# Patient Record
Sex: FEMALE | Race: WHITE | NOT HISPANIC OR LATINO | URBAN - METROPOLITAN AREA
[De-identification: names, ages, dates, MRNs, and addresses within clinical notes are randomized per-mention and may not be internally consistent; named-entity substitution may affect disease eponyms.]

---

## 2024-10-17 ENCOUNTER — APPOINTMENT (OUTPATIENT)
Dept: PRIMARY CARE | Facility: CLINIC | Age: 59
End: 2024-10-17

## 2024-10-17 VITALS
SYSTOLIC BLOOD PRESSURE: 136 MMHG | BODY MASS INDEX: 30.14 KG/M2 | HEART RATE: 73 BPM | WEIGHT: 163.8 LBS | OXYGEN SATURATION: 96 % | HEIGHT: 62 IN | DIASTOLIC BLOOD PRESSURE: 90 MMHG

## 2024-10-17 DIAGNOSIS — L40.50 PSORIATIC ARTHRITIS (MULTI): Primary | ICD-10-CM

## 2024-10-17 DIAGNOSIS — K57.90 DIVERTICULOSIS: ICD-10-CM

## 2024-10-17 DIAGNOSIS — Z12.31 ENCOUNTER FOR SCREENING MAMMOGRAM FOR BREAST CANCER: ICD-10-CM

## 2024-10-17 DIAGNOSIS — Z00.00 ANNUAL PHYSICAL EXAM: ICD-10-CM

## 2024-10-17 DIAGNOSIS — R03.0 ELEVATED BLOOD PRESSURE READING IN OFFICE WITHOUT DIAGNOSIS OF HYPERTENSION: ICD-10-CM

## 2024-10-17 DIAGNOSIS — Z23 FLU VACCINE NEED: ICD-10-CM

## 2024-10-17 PROBLEM — D25.1 INTRAMURAL LEIOMYOMA OF UTERUS: Status: ACTIVE | Noted: 2021-07-26

## 2024-10-17 PROCEDURE — 99203 OFFICE O/P NEW LOW 30 MIN: CPT | Performed by: INTERNAL MEDICINE

## 2024-10-17 PROCEDURE — 99386 PREV VISIT NEW AGE 40-64: CPT | Performed by: INTERNAL MEDICINE

## 2024-10-17 PROCEDURE — 90656 IIV3 VACC NO PRSV 0.5 ML IM: CPT | Performed by: INTERNAL MEDICINE

## 2024-10-17 PROCEDURE — 90471 IMMUNIZATION ADMIN: CPT | Performed by: INTERNAL MEDICINE

## 2024-10-17 PROCEDURE — 3008F BODY MASS INDEX DOCD: CPT | Performed by: INTERNAL MEDICINE

## 2024-10-17 PROCEDURE — 1036F TOBACCO NON-USER: CPT | Performed by: INTERNAL MEDICINE

## 2024-10-17 RX ORDER — ACETAMINOPHEN 500 MG
5000 TABLET ORAL
COMMUNITY

## 2024-10-17 RX ORDER — VALACYCLOVIR HYDROCHLORIDE 500 MG/1
500 TABLET, FILM COATED ORAL
COMMUNITY
Start: 2024-09-09

## 2024-10-17 RX ORDER — FOLIC ACID 1 MG/1
1 TABLET ORAL DAILY
COMMUNITY

## 2024-10-17 RX ORDER — METHOTREXATE 2.5 MG/1
10 TABLET ORAL WEEKLY
COMMUNITY
Start: 2024-06-09 | End: 2025-06-09

## 2024-10-17 ASSESSMENT — ENCOUNTER SYMPTOMS
VOMITING: 0
ABDOMINAL PAIN: 0
DIARRHEA: 0
PALPITATIONS: 0
FREQUENCY: 0
TROUBLE SWALLOWING: 0
DYSURIA: 0
CONSTIPATION: 0
NAUSEA: 0
DIZZINESS: 0
LIGHT-HEADEDNESS: 0
SHORTNESS OF BREATH: 0
ARTHRALGIAS: 0
SORE THROAT: 0
FATIGUE: 0
COUGH: 0
FEVER: 0

## 2024-10-17 ASSESSMENT — PATIENT HEALTH QUESTIONNAIRE - PHQ9
2. FEELING DOWN, DEPRESSED OR HOPELESS: NOT AT ALL
SUM OF ALL RESPONSES TO PHQ9 QUESTIONS 1 AND 2: 0
1. LITTLE INTEREST OR PLEASURE IN DOING THINGS: NOT AT ALL

## 2024-10-17 ASSESSMENT — PAIN SCALES - GENERAL: PAINLEVEL_OUTOF10: 0-NO PAIN

## 2024-10-17 NOTE — ASSESSMENT & PLAN NOTE
Patient record blood pressure readings at home over the next month and return in 1 month with those readings so we can evaluate her for possible hypertension.

## 2024-10-17 NOTE — PROGRESS NOTES
Subjective   Patient ID: Crystal Hargrove is a 58 y.o. female who presents for No chief complaint on file..  Patient is here to establish with new primary care insurance at [facility] due to insurance purposes.  Patient has a known diagnosis of psoriatic arthritis and is seeing Dr. Barahona rheumatology at the [facility] however she would like to switch to  physician.  Patient does note that she had 3 UTIs in the past year prior to that it has not been a problem.        Review of Systems   Constitutional:  Negative for fatigue and fever.   HENT:  Negative for sore throat and trouble swallowing.    Eyes:  Negative for visual disturbance.   Respiratory:  Negative for cough and shortness of breath.    Cardiovascular:  Negative for chest pain, palpitations and leg swelling.   Gastrointestinal:  Negative for abdominal pain, constipation, diarrhea, nausea and vomiting.   Genitourinary:  Negative for dysuria and frequency.   Musculoskeletal:  Negative for arthralgias.   Skin:  Negative for rash.   Neurological:  Negative for dizziness and light-headedness.       Objective   Medication Documentation Review Audit       Reviewed by Coco Roberson MD (Physician) on 10/17/24 at 1008      Medication Order Taking? Sig Documenting Provider Last Dose Status   ASCORBIC ACID, VITAMIN C, ORAL [id number]  Take by mouth. Historical Provider, MD  Active   cholecalciferol (Vitamin D-3) 5,000 Units tablet [id number]  Take 1 tablet (5,000 Units) by mouth once daily. Historical Provider, MD  Active   folic acid (Folvite) 1 mg tablet [id number]  Take 1 tablet (1 mg) by mouth once daily. Historical Provider, MD  Active   methotrexate (Trexall) 2.5 mg tablet [id number] Yes Take 4 tablets (10 mg total) by mouth 1 (one) time per week. Historical Provider, MD  Active   valACYclovir (Valtrex) 500 mg tablet [id number] Yes Take 1 tablet (500 mg) by mouth once daily. Historical Provider, MD  Active                  Allergies   Allergen  "Reactions    Amoxicillin Hives    Clarithromycin Hives    Ibuprofen Hives and Swelling    Naproxen Rash    Sulfa (Sulfonamide Antibiotics) Rash       /90   Pulse 73   Ht 1.575 m (5' 2\")   Wt 74.3 kg (163 lb 12.8 oz)   SpO2 96%   BMI 29.96 kg/m²     Physical Exam  Constitutional:       Appearance: Normal appearance.   HENT:      Head: Normocephalic and atraumatic.      Nose: Nose normal.   Eyes:      Extraocular Movements: Extraocular movements intact.      Pupils: Pupils are equal, round, and reactive to light.   Cardiovascular:      Rate and Rhythm: Normal rate and regular rhythm.   Pulmonary:      Breath sounds: Normal breath sounds.   Abdominal:      General: Abdomen is flat. Bowel sounds are normal.      Palpations: Abdomen is soft.   Musculoskeletal:      Right lower leg: No edema.      Left lower leg: No edema.   Neurological:      Mental Status: She is alert.           Assessment/Plan   Problem List Items Addressed This Visit       Psoriatic arthritis (Multi) - Primary     Patient will remain on methotrexate and folic acid until we can get her established with a new rheumatologist at [facility].         Relevant Orders    Referral to Rheumatology    Annual physical exam     Patient's blood pressure is elevated today, she is not sure if it has been consistently elevated.  At this time I have asked her to buy blood pressure machine and check her readings 2-3 times a week both at home and at work and return in 1 month for evaluation of possible hypertension    Baseline labs will be obtained today including CBC, CMP, lipids, thyroid and vitamin D level    Patient needs a mammogram and was given a requisition    Colonoscopy was completed September 2021 and is due in 10 years             Relevant Orders    Lipid Panel    CBC    Comprehensive Metabolic Panel    TSH with reflex to Free T4 if abnormal    Vitamin D 25-Hydroxy,Total (for eval of Vitamin D levels)    Diverticulosis     Patient was " having problems with left lower quadrant abdominal pain in 2021 had a full evaluation and was diagnosed with a spastic colon and diverticulosis after colonoscopy September 2021.  She still has issues with it pain in the area that comes and goes and it does appear to be slightly related to constipation issues.  When she starts feeling the pain she starts taking Metamucil or MiraLAX and typically it then subsides         Elevated blood pressure reading in office without diagnosis of hypertension     Patient record blood pressure readings at home over the next month and return in 1 month with those readings so we can evaluate her for possible hypertension.          Other Visit Diagnoses       Flu vaccine need        Relevant Orders    Flu vaccine, trivalent, preservative free, age 6 months and greater (Fluarix/Fluzone/Flulaval) (Completed)    Encounter for screening mammogram for breast cancer        Relevant Orders    BI mammo bilateral screening tomosynthesis                   It has been a pleasure seeing you.  Coco Roberson MD

## 2024-10-17 NOTE — ASSESSMENT & PLAN NOTE
Patient will remain on methotrexate and folic acid until we can get her established with a new rheumatologist at [facility].

## 2024-10-17 NOTE — ASSESSMENT & PLAN NOTE
Patient's blood pressure is elevated today, she is not sure if it has been consistently elevated.  At this time I have asked her to buy blood pressure machine and check her readings 2-3 times a week both at home and at work and return in 1 month for evaluation of possible hypertension    Baseline labs will be obtained today including CBC, CMP, lipids, thyroid and vitamin D level    Patient needs a mammogram and was given a requisition    Colonoscopy was completed September 2021 and is due in 10 years

## 2024-10-17 NOTE — PATIENT INSTRUCTIONS
Set up [facility] referral for Psoriatic arthritis    Get fasting labs    Check BP 2 to 3 times a week and follow up Dr Roberson in 1 month for BP eval  30 min appointment    Set up mammogram

## 2024-10-17 NOTE — ASSESSMENT & PLAN NOTE
>>ASSESSMENT AND PLAN FOR ELEVATED BLOOD PRESSURE READING IN OFFICE WITHOUT DIAGNOSIS OF HYPERTENSION WRITTEN ON 10/17/2024 10:43 AM BY PACO SHULTZ MD    Patient record blood pressure readings at home over the next month and return in 1 month with those readings so we can evaluate her for possible hypertension.

## 2024-10-17 NOTE — ASSESSMENT & PLAN NOTE
Patient was having problems with left lower quadrant abdominal pain in 2021 had a full evaluation and was diagnosed with a spastic colon and diverticulosis after colonoscopy September 2021.  She still has issues with it pain in the area that comes and goes and it does appear to be slightly related to constipation issues.  When she starts feeling the pain she starts taking Metamucil or MiraLAX and typically it then subsides

## 2024-10-22 ENCOUNTER — HOSPITAL ENCOUNTER (OUTPATIENT)
Dept: RADIOLOGY | Facility: CLINIC | Age: 59
Discharge: HOME | End: 2024-10-22
Payer: COMMERCIAL

## 2024-10-22 VITALS — BODY MASS INDEX: 30 KG/M2 | WEIGHT: 163 LBS | HEIGHT: 62 IN

## 2024-10-22 DIAGNOSIS — Z12.31 ENCOUNTER FOR SCREENING MAMMOGRAM FOR BREAST CANCER: ICD-10-CM

## 2024-10-22 PROCEDURE — 77067 SCR MAMMO BI INCL CAD: CPT | Performed by: RADIOLOGY

## 2024-10-22 PROCEDURE — 77067 SCR MAMMO BI INCL CAD: CPT

## 2024-10-22 PROCEDURE — 77063 BREAST TOMOSYNTHESIS BI: CPT | Performed by: RADIOLOGY

## 2024-10-24 ENCOUNTER — HOSPITAL ENCOUNTER (OUTPATIENT)
Dept: RADIOLOGY | Facility: EXTERNAL LOCATION | Age: 59
Discharge: HOME | End: 2024-10-24

## 2024-10-24 DIAGNOSIS — Z12.31 ENCOUNTER FOR SCREENING MAMMOGRAM FOR BREAST CANCER: ICD-10-CM

## 2024-11-14 ENCOUNTER — APPOINTMENT (OUTPATIENT)
Dept: PRIMARY CARE | Facility: CLINIC | Age: 59
End: 2024-11-14
Payer: COMMERCIAL

## 2024-11-14 VITALS
SYSTOLIC BLOOD PRESSURE: 150 MMHG | BODY MASS INDEX: 30.47 KG/M2 | OXYGEN SATURATION: 97 % | WEIGHT: 165.6 LBS | HEART RATE: 73 BPM | HEIGHT: 62 IN | DIASTOLIC BLOOD PRESSURE: 88 MMHG

## 2024-11-14 DIAGNOSIS — I10 PRIMARY HYPERTENSION: Primary | ICD-10-CM

## 2024-11-14 PROBLEM — E78.00 PURE HYPERCHOLESTEROLEMIA: Status: ACTIVE | Noted: 2024-11-14

## 2024-11-14 PROBLEM — A60.00 GENITAL HERPES SIMPLEX: Status: ACTIVE | Noted: 2024-11-14

## 2024-11-14 PROCEDURE — 3079F DIAST BP 80-89 MM HG: CPT | Performed by: INTERNAL MEDICINE

## 2024-11-14 PROCEDURE — 3077F SYST BP >= 140 MM HG: CPT | Performed by: INTERNAL MEDICINE

## 2024-11-14 PROCEDURE — 1036F TOBACCO NON-USER: CPT | Performed by: INTERNAL MEDICINE

## 2024-11-14 PROCEDURE — 3008F BODY MASS INDEX DOCD: CPT | Performed by: INTERNAL MEDICINE

## 2024-11-14 PROCEDURE — 99213 OFFICE O/P EST LOW 20 MIN: CPT | Performed by: INTERNAL MEDICINE

## 2024-11-14 RX ORDER — LISINOPRIL 2.5 MG/1
2.5 TABLET ORAL DAILY
Qty: 30 TABLET | Refills: 1 | Status: SHIPPED | OUTPATIENT
Start: 2024-11-14 | End: 2025-12-19

## 2024-11-14 ASSESSMENT — ENCOUNTER SYMPTOMS
SORE THROAT: 0
FEVER: 0
CONSTIPATION: 0
NAUSEA: 0
ARTHRALGIAS: 0
VOMITING: 0
DYSURIA: 0
FATIGUE: 0
LIGHT-HEADEDNESS: 0
TROUBLE SWALLOWING: 0
FREQUENCY: 0
ABDOMINAL PAIN: 0
SHORTNESS OF BREATH: 0
PALPITATIONS: 0
DIARRHEA: 0
COUGH: 0
DIZZINESS: 0

## 2024-11-14 ASSESSMENT — PATIENT HEALTH QUESTIONNAIRE - PHQ9
1. LITTLE INTEREST OR PLEASURE IN DOING THINGS: NOT AT ALL
SUM OF ALL RESPONSES TO PHQ9 QUESTIONS 1 AND 2: 0
2. FEELING DOWN, DEPRESSED OR HOPELESS: NOT AT ALL

## 2024-11-14 ASSESSMENT — PAIN SCALES - GENERAL: PAINLEVEL_OUTOF10: 0-NO PAIN

## 2024-11-14 NOTE — PATIENT INSTRUCTIONS
Take lisinopril 2.5mg a day    Follow up Dr Roberson in 1 month for HTN 30 min appointment

## 2024-11-14 NOTE — ASSESSMENT & PLAN NOTE
Patient's blood pressure readings at home and in the office are consistently elevated.  At this time we are going to start her on an ACE inhibitor, specifically lisinopril 2.5 mg daily.  Potential side effects including but not limited to cough and angioedema were reviewed with the patient today.  The purpose of the medication as well as side effects were reviewed with the patient.  She will start lisinopril 2.5 mg daily and I will see her back in 1 month to reevaluate her.  She is to bring in blood pressure readings from home to that follow-up appointment    Patient got labs done at [facility] I have reviewed them with her on her phone and she has a mild elevation in her LDL cholesterol.  She is encouraged to work on a low-fat diet as well.  I have not received the full faxed report from [facility] yet and will respond to the patient when I do

## 2024-11-14 NOTE — PROGRESS NOTES
Subjective   Patient ID: Crystal Hargrove is a 58 y.o. female who presents for 1 month follow up fpr HTN management.    Patient is here for 1 month follow-up for evaluation of possible hypertension.  I readings are significantly higher than hers but even at home her blood pressure is significantly elevated with a diastolic consistently in the 90s.  For this reason we will diagnose her with primary hypertension and start medication.  She has already been working on diet and exercise and lifestyle modifications which she is to continue.  She is encouraged to work on weight loss regular exercise and a low-sodium diet            Review of Systems   Constitutional:  Negative for fatigue and fever.   HENT:  Negative for sore throat and trouble swallowing.    Eyes:  Negative for visual disturbance.   Respiratory:  Negative for cough and shortness of breath.    Cardiovascular:  Negative for chest pain, palpitations and leg swelling.   Gastrointestinal:  Negative for abdominal pain, constipation, diarrhea, nausea and vomiting.   Genitourinary:  Negative for dysuria and frequency.   Musculoskeletal:  Negative for arthralgias.   Skin:  Negative for rash.   Neurological:  Negative for dizziness and light-headedness.       Objective   Medication Documentation Review Audit       Reviewed by Coco Roberson MD (Physician) on 11/14/24 at 0802      Medication Order Taking? Sig Documenting Provider Last Dose Status   ASCORBIC ACID, VITAMIN C, ORAL [id number]  Take by mouth. Historical Provider, MD  Active   cholecalciferol (Vitamin D-3) 5,000 Units tablet [id number]  Take 1 tablet (5,000 Units) by mouth once daily. Historical Provider, MD  Active   folic acid (Folvite) 1 mg tablet [id number]  Take 1 tablet (1 mg) by mouth once daily. Historical Provider, MD  Active   methotrexate (Trexall) 2.5 mg tablet [id number]  Take 4 tablets (10 mg total) by mouth 1 (one) time per week. Historical Provider, MD  Active   valACYclovir (Valtrex) 500 mg  "tablet [id number]  Take 1 tablet (500 mg) by mouth once daily. Historical Provider, MD  Active                  Allergies   Allergen Reactions    Amoxicillin Hives    Clarithromycin Hives    Ibuprofen Hives and Swelling    Naproxen Rash    Sulfa (Sulfonamide Antibiotics) Rash       /88   Pulse 73   Ht 1.575 m (5' 2\")   Wt 75.1 kg (165 lb 9.6 oz)   SpO2 97%   BMI 30.29 kg/m²     Physical Exam  Constitutional:       Appearance: Normal appearance.   HENT:      Head: Normocephalic and atraumatic.      Nose: Nose normal.   Eyes:      Extraocular Movements: Extraocular movements intact.      Pupils: Pupils are equal, round, and reactive to light.   Cardiovascular:      Rate and Rhythm: Normal rate and regular rhythm.   Pulmonary:      Breath sounds: Normal breath sounds.   Abdominal:      General: Abdomen is flat. Bowel sounds are normal.      Palpations: Abdomen is soft.   Musculoskeletal:      Right lower leg: No edema.      Left lower leg: No edema.   Neurological:      Mental Status: She is alert.           Assessment/Plan   Problem List Items Addressed This Visit       Primary hypertension - Primary     Patient's blood pressure readings at home and in the office are consistently elevated.  At this time we are going to start her on an ACE inhibitor, specifically lisinopril 2.5 mg daily.  Potential side effects including but not limited to cough and angioedema were reviewed with the patient today.  The purpose of the medication as well as side effects were reviewed with the patient.  She will start lisinopril 2.5 mg daily and I will see her back in 1 month to reevaluate her.  She is to bring in blood pressure readings from home to that follow-up appointment    Patient got labs done at [facility] I have reviewed them with her on her phone and she has a mild elevation in her LDL cholesterol.  She is encouraged to work on a low-fat diet as well.  I have not received the full faxed report from [facility] yet and will " respond to the patient when I do         Relevant Medications    lisinopril 2.5 mg tablet              It has been a pleasure seeing you.  Coco Roberson MD

## 2024-12-17 ENCOUNTER — APPOINTMENT (OUTPATIENT)
Dept: PRIMARY CARE | Facility: CLINIC | Age: 59
End: 2024-12-17
Payer: COMMERCIAL

## 2024-12-29 DIAGNOSIS — I10 PRIMARY HYPERTENSION: ICD-10-CM

## 2025-01-03 RX ORDER — LISINOPRIL 2.5 MG/1
2.5 TABLET ORAL DAILY
Qty: 30 TABLET | Refills: 1 | OUTPATIENT
Start: 2025-01-03 | End: 2026-02-07

## 2025-01-22 ENCOUNTER — APPOINTMENT (OUTPATIENT)
Dept: PRIMARY CARE | Facility: CLINIC | Age: 60
End: 2025-01-22
Payer: COMMERCIAL

## 2025-01-22 VITALS
BODY MASS INDEX: 30.69 KG/M2 | RESPIRATION RATE: 16 BRPM | SYSTOLIC BLOOD PRESSURE: 120 MMHG | OXYGEN SATURATION: 98 % | DIASTOLIC BLOOD PRESSURE: 80 MMHG | HEART RATE: 75 BPM | WEIGHT: 166.8 LBS | HEIGHT: 62 IN

## 2025-01-22 DIAGNOSIS — Z78.0 ASYMPTOMATIC MENOPAUSAL STATE: Primary | ICD-10-CM

## 2025-01-22 DIAGNOSIS — I10 PRIMARY HYPERTENSION: ICD-10-CM

## 2025-01-22 PROCEDURE — 3074F SYST BP LT 130 MM HG: CPT | Performed by: INTERNAL MEDICINE

## 2025-01-22 PROCEDURE — 3008F BODY MASS INDEX DOCD: CPT | Performed by: INTERNAL MEDICINE

## 2025-01-22 PROCEDURE — 1036F TOBACCO NON-USER: CPT | Performed by: INTERNAL MEDICINE

## 2025-01-22 PROCEDURE — 99213 OFFICE O/P EST LOW 20 MIN: CPT | Performed by: INTERNAL MEDICINE

## 2025-01-22 PROCEDURE — 3079F DIAST BP 80-89 MM HG: CPT | Performed by: INTERNAL MEDICINE

## 2025-01-22 RX ORDER — LISINOPRIL 5 MG/1
5 TABLET ORAL DAILY
Qty: 90 TABLET | Refills: 1 | Status: SHIPPED | OUTPATIENT
Start: 2025-01-22 | End: 2026-02-26

## 2025-01-22 ASSESSMENT — ENCOUNTER SYMPTOMS
PALPITATIONS: 0
FEVER: 0
ARTHRALGIAS: 0
ABDOMINAL PAIN: 0
VOMITING: 0
TROUBLE SWALLOWING: 0
FREQUENCY: 0
FATIGUE: 0
SORE THROAT: 0
DIARRHEA: 0
SHORTNESS OF BREATH: 0
DYSURIA: 0
COUGH: 0
LIGHT-HEADEDNESS: 0
CONSTIPATION: 0
NAUSEA: 0
DIZZINESS: 0

## 2025-01-22 ASSESSMENT — PATIENT HEALTH QUESTIONNAIRE - PHQ9
SUM OF ALL RESPONSES TO PHQ9 QUESTIONS 1 AND 2: 0
2. FEELING DOWN, DEPRESSED OR HOPELESS: NOT AT ALL
1. LITTLE INTEREST OR PLEASURE IN DOING THINGS: NOT AT ALL

## 2025-01-22 ASSESSMENT — PAIN SCALES - GENERAL: PAINLEVEL_OUTOF10: 0-NO PAIN

## 2025-01-22 NOTE — ASSESSMENT & PLAN NOTE
Patient's blood pressure is still not optimal at this time so we will increase lisinopril to 5 mg daily.  She will see me back in 4 months at which time she will bring in a series of home blood pressure readings again.

## 2025-01-22 NOTE — PATIENT INSTRUCTIONS
Increase lisinopril to 5 mg a day    Follow up Dr Roberson in 4 months for HTN, and bring in home [facility] to appointment.    Get dxa after 3/16/25

## 2025-01-22 NOTE — PROGRESS NOTES
Subjective   Patient ID: Crystal Hargrove is a 59 y.o. female who presents for No chief complaint on file..  Patient is here for routine follow-up on her hypertension.  She brings in several blood pressure readings from home and they are consistently mildly elevated.  She is taking lisinopril 2.5 mg daily and has no apparent side effects including no cough.        Review of Systems   Constitutional:  Negative for fatigue and fever.   HENT:  Negative for sore throat and trouble swallowing.    Eyes:  Negative for visual disturbance.   Respiratory:  Negative for cough and shortness of breath.    Cardiovascular:  Negative for chest pain, palpitations and leg swelling.   Gastrointestinal:  Negative for abdominal pain, constipation, diarrhea, nausea and vomiting.   Genitourinary:  Negative for dysuria and frequency.   Musculoskeletal:  Negative for arthralgias.   Skin:  Negative for rash.   Neurological:  Negative for dizziness and light-headedness.       Objective   Medication Documentation Review Audit       Reviewed by Coco Roberson MD (Physician) on 01/22/25 at 0840      Medication Order Taking? Sig Documenting Provider Last Dose Status   ASCORBIC ACID, VITAMIN C, ORAL [id number]  Take by mouth. Historical Provider, MD  Active   cholecalciferol (Vitamin D-3) 5,000 Units tablet [id number]  Take 1 tablet (5,000 Units) by mouth once daily. Historical Provider, MD  Active   folic acid (Folvite) 1 mg tablet [id number]  Take 1 tablet (1 mg) by mouth once daily. Historical Provider, MD  Active   lisinopril 2.5 mg tablet [id number]  Take 1 tablet (2.5 mg) by mouth once daily. Coco Roberson MD  Active   methotrexate (Trexall) 2.5 mg tablet [id number]  Take 4 tablets (10 mg total) by mouth 1 (one) time per week. Historical Provider, MD  Active   valACYclovir (Valtrex) 500 mg tablet [id number]  Take 1 tablet (500 mg) by mouth once daily. Historical Provider, MD  Active                  Allergies   Allergen Reactions    Amoxicillin  "Hives    Clarithromycin Hives    Ibuprofen Hives and Swelling    Naproxen Rash    Sulfa (Sulfonamide Antibiotics) Rash       /80   Pulse 75   Resp 16   Ht 1.575 m (5' 2\")   Wt 75.7 kg (166 lb 12.8 oz)   SpO2 98%   BMI 30.51 kg/m²     Physical Exam  Constitutional:       Appearance: Normal appearance.   HENT:      Head: Normocephalic and atraumatic.      Nose: Nose normal.   Eyes:      Extraocular Movements: Extraocular movements intact.      Pupils: Pupils are equal, round, and reactive to light.   Cardiovascular:      Rate and Rhythm: Normal rate and regular rhythm.   Pulmonary:      Breath sounds: Normal breath sounds.   Abdominal:      General: Abdomen is flat. Bowel sounds are normal.      Palpations: Abdomen is soft.   Musculoskeletal:      Right lower leg: No edema.      Left lower leg: No edema.   Neurological:      Mental Status: She is alert.           Assessment/Plan   Problem List Items Addressed This Visit       Primary hypertension     Patient's blood pressure is still not optimal at this time so we will increase lisinopril to 5 mg daily.  She will see me back in 4 months at which time she will bring in a series of home blood pressure readings again.           Relevant Medications    lisinopril 5 mg tablet     Other Visit Diagnoses       Asymptomatic menopausal state    -  Primary    Relevant Orders    XR DEXA bone density          Patient is due for bone density was given an order today we will set up that appointment after March 16         It has been a pleasure seeing you.  Coco Roberson MD  "

## 2025-02-11 ENCOUNTER — APPOINTMENT (OUTPATIENT)
Dept: RHEUMATOLOGY | Facility: CLINIC | Age: 60
End: 2025-02-11
Payer: COMMERCIAL

## 2025-02-11 VITALS
BODY MASS INDEX: 30 KG/M2 | HEART RATE: 67 BPM | DIASTOLIC BLOOD PRESSURE: 65 MMHG | SYSTOLIC BLOOD PRESSURE: 120 MMHG | WEIGHT: 164 LBS

## 2025-02-11 DIAGNOSIS — L40.50 PSORIATIC ARTHRITIS (MULTI): ICD-10-CM

## 2025-02-11 PROCEDURE — 3074F SYST BP LT 130 MM HG: CPT | Performed by: INTERNAL MEDICINE

## 2025-02-11 PROCEDURE — 99204 OFFICE O/P NEW MOD 45 MIN: CPT | Performed by: INTERNAL MEDICINE

## 2025-02-11 PROCEDURE — 3078F DIAST BP <80 MM HG: CPT | Performed by: INTERNAL MEDICINE

## 2025-02-11 RX ORDER — FOLIC ACID 1 MG/1
1 TABLET ORAL DAILY
Qty: 90 TABLET | Refills: 1 | Status: SHIPPED | OUTPATIENT
Start: 2025-02-11

## 2025-02-11 RX ORDER — METHOTREXATE 2.5 MG/1
12.5 TABLET ORAL WEEKLY
Qty: 20 TABLET | Refills: 3 | Status: SHIPPED | OUTPATIENT
Start: 2025-02-11

## 2025-02-11 NOTE — PROGRESS NOTES
59 y.o.        female    referred by Dr. Roberson        CHIEF COMPLAINT: Management of PsA    HPI: 59 yr olf WF with H/O psoriatic arthritis since 2012. It was well controled with methotrexate but in the last year has flared. She has morning stiffness for 30 minutes. Has pain in  MCPs. No pain in shoulders, PIPs and wrists. Has no pain in feet. She is able to hike with no problem. Knees hurt when sitting at the desk.   She has chronic neck pain.     Patient Active Problem List   Diagnosis    Intramural leiomyoma of uterus    Psoriatic arthritis (Multi)    Annual physical exam    Diverticulosis    Pure hypercholesterolemia    Genital herpes simplex    Primary hypertension         Past Medical History:   Diagnosis Date    Personal history of diseases of the skin and subcutaneous tissue     History of psoriatic arthritis    Personal history of other diseases of the musculoskeletal system and connective tissue     History of polyarthritis    Personal history of other diseases of the nervous system and sense organs     History of impacted cerumen    Polymyalgia rheumatica (Multi)     Polymyalgia    Unspecified otitis externa, right ear     Otitis externa of right ear            Past Surgical History:   Procedure Laterality Date    MOUTH SURGERY  03/20/2015    Oral Surgery Tooth Extraction    WISDOM TOOTH EXTRACTION         Allergies   Allergen Reactions    Amoxicillin Hives    Clarithromycin Hives    Ibuprofen Hives and Swelling    Naproxen Rash    Sulfa (Sulfonamide Antibiotics) Rash       Medication Documentation Review Audit       Reviewed by Jude Jim MA (Medical Assistant) on 02/11/25 at 1014      Medication Order Taking? Sig Documenting Provider Last Dose Status   ASCORBIC ACID, VITAMIN C, ORAL [id number]  Take by mouth. Historical Provider, MD  Active   cholecalciferol (Vitamin D-3) 5,000 Units tablet [id number]  Take 1 tablet (5,000 Units) by mouth once daily. Historical Provider, MD  Active   folic  acid (Folvite) 1 mg tablet [id number]  Take 1 tablet (1 mg) by mouth once daily. Historical Provider, MD  Active   lisinopril 5 mg tablet [id number]  Take 1 tablet (5 mg) by mouth once daily. Coco Roberson MD  Active   methotrexate (Trexall) 2.5 mg tablet [id number]  Take 4 tablets (10 mg total) by mouth 1 (one) time per week. Historical Provider, MD  Active   valACYclovir (Valtrex) 500 mg tablet [id number]  Take 1 tablet (500 mg) by mouth once daily. Historical Provider, MD  Active                        Family History   Problem Relation Name Age of Onset    Breast cancer Mother  75    Stroke Mother      Transient ischemic attack Father      No Known Problems Sister      No Known Problems Brother      Breast cancer Maternal Grandmother            Social History     Tobacco Use    Smoking status: Former     Current packs/day: 0.25     Average packs/day: 0.3 packs/day for 40.1 years (10.0 ttl pk-yrs)     Types: Cigarettes     Start date: 1985     Passive exposure: Never    Smokeless tobacco: Never   Vaping Use    Vaping status: Never Used   Substance Use Topics    Alcohol use: Yes     Comment: once a week    Drug use: Never         Review of Systems    REVIEW OF SYSTEMS:  Constitutional: No fatigue  Skin:  No rash or psoriasis or photosensitvity  Head: No headache, oral ulcers or hair loss  Neck: No difficulty swallowing or choking  Eyes: No dry eyes or iritis  Mouth: No dry mouth  or oral ulcers  Pulmonary: No wheezing, pleurisy or SOB  Cardiovascular: No chest pain or palpitations  Gastrointestinal: No abdominal pain, nausea, heartburn, or blood in stool  Endocrine: No Raynaud's   Musculoskeletal: As H/P        PHYSICAL EXAM:  Visit Vitals  /65   Pulse 67     General - NAD, sitting up in chair, well-groomed, pleasant, AAOx3  Head: Normocephalic, atraumatic  Eyes - PERRLA, EOMI. No conjunctiva injection.   Mouth/ENT - Moist oral and nasal mucosa. No facial rash. No enlarged parotid or submandibular gland.  Adequate salivary pooling.  Cardiovascular - Normal S1, S2. Regular rate and rhythm. No murmurs or rubs.  Lungs - Symmetric chest expansion. Clear to auscultation bilaterally.   Skin - No rashes or ulcers. Skin warm and dry. No erythema on bilateral cheeks.  Abdomen - Soft, non-tender. No masses. Normal bowel sounds.  Extremities - No edema, cyanosis ,or clubbing  Neurological - Alert and oriented x 3,  grossly intact. No focal deficit.  Musculoskeletal -  EXAMJOINTDETAILED,  Shoulders: Full ROM, without pain, no swelling, warmth or tenderness.  Elbows: Full ROM, without pain, no swelling, warmth or tenderness.  Wrists: Full ROM, without pain, no swelling, warmth or tenderness.  MCP: No swelling, warmth or tenderness. Metacarpal squeeze negative  PIP: No swelling, warmth or tenderness.  DIP: No swelling, warmth or tenderness.  Hands : 5/5.    Ankles: Full ROM, without pain, swelling, warmth or tenderness.  Toes: No swelling, warmth or tenderness. Metatarsal squeeze negative    Assessment/plan: 59 yr old with PsA. She has mild arthralgia's in hands. No evidenc eof synovitis. Will increase methotrexate to 5 pills/week, Will follow up in 4 months.   Long term use of high risk meds: Labs ordered.     Reviewed and approved by EDGAR HOBSON on 2/11/25 at 10:29 AM.        Edgar Hobson MD

## 2025-03-13 DIAGNOSIS — A60.00 GENITAL HERPES SIMPLEX, UNSPECIFIED SITE: Primary | ICD-10-CM

## 2025-03-13 NOTE — TELEPHONE ENCOUNTER
This can wait until Monday    Refill    valACYclovir (Valtrex) 500 mg tablet     [facility] - [facility]

## 2025-03-16 RX ORDER — VALACYCLOVIR HYDROCHLORIDE 500 MG/1
500 TABLET, FILM COATED ORAL
Qty: 90 TABLET | Refills: 0 | Status: SHIPPED | OUTPATIENT
Start: 2025-03-16

## 2025-03-19 ENCOUNTER — APPOINTMENT (OUTPATIENT)
Dept: RADIOLOGY | Facility: CLINIC | Age: 60
End: 2025-03-19
Payer: COMMERCIAL

## 2025-04-17 ENCOUNTER — APPOINTMENT (OUTPATIENT)
Dept: RADIOLOGY | Facility: CLINIC | Age: 60
End: 2025-04-17
Payer: COMMERCIAL

## 2025-04-30 ENCOUNTER — HOSPITAL ENCOUNTER (OUTPATIENT)
Dept: RADIOLOGY | Facility: CLINIC | Age: 60
Discharge: HOME | End: 2025-04-30
Payer: COMMERCIAL

## 2025-04-30 DIAGNOSIS — Z78.0 ASYMPTOMATIC MENOPAUSAL STATE: ICD-10-CM

## 2025-04-30 PROCEDURE — 77080 DXA BONE DENSITY AXIAL: CPT

## 2025-04-30 PROCEDURE — 77080 DXA BONE DENSITY AXIAL: CPT | Performed by: STUDENT IN AN ORGANIZED HEALTH CARE EDUCATION/TRAINING PROGRAM

## 2025-05-06 ENCOUNTER — TELEPHONE (OUTPATIENT)
Dept: PRIMARY CARE | Facility: CLINIC | Age: 60
End: 2025-05-06
Payer: COMMERCIAL

## 2025-05-06 NOTE — TELEPHONE ENCOUNTER
----- Message from Coco Roberson sent at 5/4/2025  3:21 PM EDT -----  Notify patient she has osteopenia on her dxa or bone density.  This is a precursor to osteoporosis.  She should take a minimum of 2000iu of VitD3 OTC every day and a minimum of 500mg of calcium 3   times a day.  Dr Roberson recommends gummie calcium chews so they are easier to take after each meal.    ----- Message -----  From: Interface, Radiology Results In  Sent: 5/2/2025  12:06 PM EDT  To: Coco Roberson MD

## 2025-05-22 ENCOUNTER — APPOINTMENT (OUTPATIENT)
Dept: PRIMARY CARE | Facility: CLINIC | Age: 60
End: 2025-05-22

## 2025-05-22 VITALS
OXYGEN SATURATION: 95 % | BODY MASS INDEX: 30.69 KG/M2 | HEART RATE: 84 BPM | DIASTOLIC BLOOD PRESSURE: 72 MMHG | WEIGHT: 166.8 LBS | HEIGHT: 62 IN | SYSTOLIC BLOOD PRESSURE: 110 MMHG

## 2025-05-22 DIAGNOSIS — I10 PRIMARY HYPERTENSION: Primary | ICD-10-CM

## 2025-05-22 DIAGNOSIS — M85.89 OSTEOPENIA OF MULTIPLE SITES: ICD-10-CM

## 2025-05-22 DIAGNOSIS — L40.50 PSORIATIC ARTHRITIS (MULTI): ICD-10-CM

## 2025-05-22 PROCEDURE — 3074F SYST BP LT 130 MM HG: CPT | Performed by: INTERNAL MEDICINE

## 2025-05-22 PROCEDURE — 3008F BODY MASS INDEX DOCD: CPT | Performed by: INTERNAL MEDICINE

## 2025-05-22 PROCEDURE — 1036F TOBACCO NON-USER: CPT | Performed by: INTERNAL MEDICINE

## 2025-05-22 PROCEDURE — 99214 OFFICE O/P EST MOD 30 MIN: CPT | Performed by: INTERNAL MEDICINE

## 2025-05-22 PROCEDURE — 3078F DIAST BP <80 MM HG: CPT | Performed by: INTERNAL MEDICINE

## 2025-05-22 ASSESSMENT — ENCOUNTER SYMPTOMS
NAUSEA: 0
DIZZINESS: 0
ABDOMINAL PAIN: 0
DIARRHEA: 0
PALPITATIONS: 0
DYSURIA: 0
FREQUENCY: 0
ARTHRALGIAS: 0
VOMITING: 0
FEVER: 0
COUGH: 0
CONSTIPATION: 0
SHORTNESS OF BREATH: 0
LIGHT-HEADEDNESS: 0
TROUBLE SWALLOWING: 0
SORE THROAT: 0
FATIGUE: 0

## 2025-05-22 ASSESSMENT — PAIN SCALES - GENERAL: PAINLEVEL_OUTOF10: 0-NO PAIN

## 2025-05-22 NOTE — PROGRESS NOTES
Subjective   Patient ID: Crystal Hargrove is a 59 y.o. female who presents for 4 month follow up for HTN and cholesterol management.    Patient is here for an regular 4-month follow-up for her hypertension high cholesterol and osteopenia  Patient recently had a bone density this past month which did show osteopenia.  She is now taking calcium 500 mg 3 times a day and vitamin D.  Medication list was updated appropriately.    Mammogram is not due until October as are her annual blood work or labs.        Review of Systems   Constitutional:  Negative for fatigue and fever.   HENT:  Negative for sore throat and trouble swallowing.    Eyes:  Negative for visual disturbance.   Respiratory:  Negative for cough and shortness of breath.    Cardiovascular:  Negative for chest pain, palpitations and leg swelling.   Gastrointestinal:  Negative for abdominal pain, constipation, diarrhea, nausea and vomiting.   Genitourinary:  Negative for dysuria and frequency.   Musculoskeletal:  Negative for arthralgias.   Skin:  Negative for rash.   Neurological:  Negative for dizziness and light-headedness.       Objective   Medication Documentation Review Audit       Reviewed by Coco Roberson MD (Physician) on 05/22/25 at 0831      Medication Order Taking? Sig Documenting Provider Last Dose Status   ASCORBIC ACID, VITAMIN C, ORAL [id number]  Take by mouth.   Patient not taking: Reported on 5/22/2025    Historical Provider, MD  Active   cholecalciferol (Vitamin D-3) 5,000 Units tablet [id number] Yes Take 1 tablet (5,000 Units) by mouth once daily. Historical Provider, MD  Active   folic acid (Folvite) 1 mg tablet [id number] Yes Take 1 tablet (1 mg) by mouth once daily. Lorraine Rodriguez MD  Active   lisinopril 5 mg tablet [id number] Yes Take 1 tablet (5 mg) by mouth once daily. Coco Roberson MD  Active   methotrexate (Trexall) 2.5 mg tablet [id number] Yes Take 5 tablets (12.5 mg total) by mouth 1 (one) time per week. Lorraine Rodriguez MD   "Active   valACYclovir (Valtrex) 500 mg tablet [id number] Yes Take 1 tablet (500 mg) by mouth once daily. Coco Roberson MD  Active                  Allergies[1]    /72   Pulse 84   Ht 1.575 m (5' 2\")   Wt 75.7 kg (166 lb 12.8 oz)   SpO2 95%   BMI 30.51 kg/m²     Physical Exam  Constitutional:       Appearance: Normal appearance.   HENT:      Head: Normocephalic and atraumatic.      Nose: Nose normal.   Eyes:      Extraocular Movements: Extraocular movements intact.      Pupils: Pupils are equal, round, and reactive to light.   Cardiovascular:      Rate and Rhythm: Normal rate and regular rhythm.   Pulmonary:      Breath sounds: Normal breath sounds.   Abdominal:      General: Abdomen is flat. Bowel sounds are normal.      Palpations: Abdomen is soft.   Musculoskeletal:      Right lower leg: No edema.      Left lower leg: No edema.   Neurological:      Mental Status: She is alert.           Assessment/Plan   Problem List Items Addressed This Visit       Psoriatic arthritis (Multi)    This is managed through her rheumatologist and she remains on methotrexate and folic acid         Primary hypertension - Primary    Blood pressure stable well-controlled.  Patient did not in any refills today         Osteopenia of multiple sites    Patient has osteopenia on bone density so she is now taking calcium, vitamin D and she is encouraged to exercise 4 times a week for 40 minutes.  Next bone density will be obtained in 2 years                   It has been a pleasure seeing you.  Coco Roberson MD         [1]   Allergies  Allergen Reactions    Amoxicillin Hives    Clarithromycin Hives    Ibuprofen Hives and Swelling    Naproxen Rash    Sulfa (Sulfonamide Antibiotics) Rash     "

## 2025-05-22 NOTE — ASSESSMENT & PLAN NOTE
Patient has osteopenia on bone density so she is now taking calcium, vitamin D and she is encouraged to exercise 4 times a week for 40 minutes.  Next bone density will be obtained in 2 years

## 2025-06-13 DIAGNOSIS — L40.50 PSORIATIC ARTHRITIS (MULTI): ICD-10-CM

## 2025-06-13 DIAGNOSIS — A60.00 GENITAL HERPES SIMPLEX, UNSPECIFIED SITE: ICD-10-CM

## 2025-06-13 RX ORDER — VALACYCLOVIR HYDROCHLORIDE 500 MG/1
500 TABLET, FILM COATED ORAL
Qty: 90 TABLET | Refills: 0 | Status: SHIPPED | OUTPATIENT
Start: 2025-06-13

## 2025-06-13 RX ORDER — FOLIC ACID 1 MG/1
1 TABLET ORAL DAILY
Qty: 90 TABLET | Refills: 1 | Status: SHIPPED | OUTPATIENT
Start: 2025-06-13

## 2025-06-13 NOTE — TELEPHONE ENCOUNTER
Refill    valACYclovir (Valtrex) 500 mg tablet   500 mg, Daily RT     folic acid (Folvite) 1 mg tablet   1 mg, Daily     [facility]

## 2025-06-15 LAB
ALBUMIN SERPL-MCNC: 4.3 G/DL (ref 3.6–5.1)
ALP SERPL-CCNC: 52 U/L (ref 37–153)
ALT SERPL-CCNC: 25 U/L (ref 6–29)
ANION GAP SERPL CALCULATED.4IONS-SCNC: 6 MMOL/L (CALC) (ref 7–17)
AST SERPL-CCNC: 19 U/L (ref 10–35)
BASOPHILS # BLD AUTO: 30 CELLS/UL (ref 0–200)
BASOPHILS NFR BLD AUTO: 0.7 %
BILIRUB SERPL-MCNC: 1.5 MG/DL (ref 0.2–1.2)
BUN SERPL-MCNC: 21 MG/DL (ref 7–25)
CALCIUM SERPL-MCNC: 9.1 MG/DL (ref 8.6–10.4)
CHLORIDE SERPL-SCNC: 106 MMOL/L (ref 98–110)
CO2 SERPL-SCNC: 27 MMOL/L (ref 20–32)
CREAT SERPL-MCNC: 0.63 MG/DL (ref 0.5–1.03)
CRP SERPL-MCNC: NORMAL MG/L
EGFRCR SERPLBLD CKD-EPI 2021: 103 ML/MIN/1.73M2
EOSINOPHIL # BLD AUTO: 133 CELLS/UL (ref 15–500)
EOSINOPHIL NFR BLD AUTO: 3.1 %
ERYTHROCYTE [DISTWIDTH] IN BLOOD BY AUTOMATED COUNT: 12.8 % (ref 11–15)
GLUCOSE SERPL-MCNC: 96 MG/DL (ref 65–99)
HCT VFR BLD AUTO: 42.2 % (ref 35–45)
HGB BLD-MCNC: 14 G/DL (ref 11.7–15.5)
LYMPHOCYTES # BLD AUTO: 1651 CELLS/UL (ref 850–3900)
LYMPHOCYTES NFR BLD AUTO: 38.4 %
MCH RBC QN AUTO: 32.1 PG (ref 27–33)
MCHC RBC AUTO-ENTMCNC: 33.2 G/DL (ref 32–36)
MCV RBC AUTO: 96.8 FL (ref 80–100)
MONOCYTES # BLD AUTO: 323 CELLS/UL (ref 200–950)
MONOCYTES NFR BLD AUTO: 7.5 %
NEUTROPHILS # BLD AUTO: 2163 CELLS/UL (ref 1500–7800)
NEUTROPHILS NFR BLD AUTO: 50.3 %
PLATELET # BLD AUTO: 187 THOUSAND/UL (ref 140–400)
PMV BLD REES-ECKER: 9.2 FL (ref 7.5–12.5)
POTASSIUM SERPL-SCNC: 4.1 MMOL/L (ref 3.5–5.3)
PROT SERPL-MCNC: 6.3 G/DL (ref 6.1–8.1)
RBC # BLD AUTO: 4.36 MILLION/UL (ref 3.8–5.1)
SODIUM SERPL-SCNC: 139 MMOL/L (ref 135–146)
WBC # BLD AUTO: 4.3 THOUSAND/UL (ref 3.8–10.8)

## 2025-06-16 LAB
ALBUMIN SERPL-MCNC: 4.3 G/DL (ref 3.6–5.1)
ALP SERPL-CCNC: 52 U/L (ref 37–153)
ALT SERPL-CCNC: 25 U/L (ref 6–29)
ANION GAP SERPL CALCULATED.4IONS-SCNC: 6 MMOL/L (CALC) (ref 7–17)
AST SERPL-CCNC: 19 U/L (ref 10–35)
BASOPHILS # BLD AUTO: 30 CELLS/UL (ref 0–200)
BASOPHILS NFR BLD AUTO: 0.7 %
BILIRUB SERPL-MCNC: 1.5 MG/DL (ref 0.2–1.2)
BUN SERPL-MCNC: 21 MG/DL (ref 7–25)
CALCIUM SERPL-MCNC: 9.1 MG/DL (ref 8.6–10.4)
CHLORIDE SERPL-SCNC: 106 MMOL/L (ref 98–110)
CO2 SERPL-SCNC: 27 MMOL/L (ref 20–32)
CREAT SERPL-MCNC: 0.63 MG/DL (ref 0.5–1.03)
CRP SERPL-MCNC: 4 MG/L
EGFRCR SERPLBLD CKD-EPI 2021: 103 ML/MIN/1.73M2
EOSINOPHIL # BLD AUTO: 133 CELLS/UL (ref 15–500)
EOSINOPHIL NFR BLD AUTO: 3.1 %
ERYTHROCYTE [DISTWIDTH] IN BLOOD BY AUTOMATED COUNT: 12.8 % (ref 11–15)
GLUCOSE SERPL-MCNC: 96 MG/DL (ref 65–99)
HCT VFR BLD AUTO: 42.2 % (ref 35–45)
HGB BLD-MCNC: 14 G/DL (ref 11.7–15.5)
LYMPHOCYTES # BLD AUTO: 1651 CELLS/UL (ref 850–3900)
LYMPHOCYTES NFR BLD AUTO: 38.4 %
MCH RBC QN AUTO: 32.1 PG (ref 27–33)
MCHC RBC AUTO-ENTMCNC: 33.2 G/DL (ref 32–36)
MCV RBC AUTO: 96.8 FL (ref 80–100)
MONOCYTES # BLD AUTO: 323 CELLS/UL (ref 200–950)
MONOCYTES NFR BLD AUTO: 7.5 %
NEUTROPHILS # BLD AUTO: 2163 CELLS/UL (ref 1500–7800)
NEUTROPHILS NFR BLD AUTO: 50.3 %
PLATELET # BLD AUTO: 187 THOUSAND/UL (ref 140–400)
PMV BLD REES-ECKER: 9.2 FL (ref 7.5–12.5)
POTASSIUM SERPL-SCNC: 4.1 MMOL/L (ref 3.5–5.3)
PROT SERPL-MCNC: 6.3 G/DL (ref 6.1–8.1)
RBC # BLD AUTO: 4.36 MILLION/UL (ref 3.8–5.1)
SODIUM SERPL-SCNC: 139 MMOL/L (ref 135–146)
WBC # BLD AUTO: 4.3 THOUSAND/UL (ref 3.8–10.8)

## 2025-06-17 ENCOUNTER — TELEPHONE (OUTPATIENT)
Dept: PRIMARY CARE | Facility: CLINIC | Age: 60
End: 2025-06-17
Payer: COMMERCIAL

## 2025-06-17 NOTE — TELEPHONE ENCOUNTER
----- Message from Coco Roberson sent at 6/17/2025  8:00 AM EDT -----  Please notify patient all her labs were normal including her C-reactive protein.  Her chemistry profile has a mild elevation in the bilirubin which is normal when some people are fasting.    ----- Message -----  From: [facility] Results In  Sent: 6/15/2025   3:13 AM EDT  To: Coco Roberson MD

## 2025-06-26 DIAGNOSIS — L40.50 PSORIATIC ARTHRITIS (MULTI): ICD-10-CM

## 2025-06-26 RX ORDER — METHOTREXATE 2.5 MG/1
12.5 TABLET ORAL WEEKLY
Qty: 20 TABLET | Refills: 3 | Status: SHIPPED | OUTPATIENT
Start: 2025-06-26

## 2025-07-01 ENCOUNTER — APPOINTMENT (OUTPATIENT)
Dept: RHEUMATOLOGY | Facility: CLINIC | Age: 60
End: 2025-07-01
Payer: COMMERCIAL

## 2025-07-30 DIAGNOSIS — I10 PRIMARY HYPERTENSION: ICD-10-CM

## 2025-07-30 RX ORDER — LISINOPRIL 5 MG/1
5 TABLET ORAL DAILY
Qty: 90 TABLET | Refills: 1 | Status: SHIPPED | OUTPATIENT
Start: 2025-07-30 | End: 2026-09-03

## 2025-08-12 ENCOUNTER — APPOINTMENT (OUTPATIENT)
Dept: RHEUMATOLOGY | Facility: CLINIC | Age: 60
End: 2025-08-12

## 2025-08-26 ENCOUNTER — APPOINTMENT (OUTPATIENT)
Dept: RHEUMATOLOGY | Facility: CLINIC | Age: 60
End: 2025-08-26

## 2025-08-26 VITALS
HEART RATE: 81 BPM | SYSTOLIC BLOOD PRESSURE: 105 MMHG | DIASTOLIC BLOOD PRESSURE: 68 MMHG | HEIGHT: 62 IN | BODY MASS INDEX: 29.44 KG/M2 | WEIGHT: 160 LBS

## 2025-08-26 DIAGNOSIS — Z79.899 LONG-TERM USE OF HIGH-RISK MEDICATION: Primary | ICD-10-CM

## 2025-08-26 DIAGNOSIS — L40.50 PSORIATIC ARTHRITIS (MULTI): ICD-10-CM

## 2025-08-26 PROCEDURE — 3074F SYST BP LT 130 MM HG: CPT | Performed by: INTERNAL MEDICINE

## 2025-08-26 PROCEDURE — 99214 OFFICE O/P EST MOD 30 MIN: CPT | Performed by: INTERNAL MEDICINE

## 2025-08-26 PROCEDURE — 3008F BODY MASS INDEX DOCD: CPT | Performed by: INTERNAL MEDICINE

## 2025-08-26 PROCEDURE — 3078F DIAST BP <80 MM HG: CPT | Performed by: INTERNAL MEDICINE

## 2025-08-26 RX ORDER — METHOTREXATE 2.5 MG/1
17.5 TABLET ORAL WEEKLY
Qty: 28 TABLET | Refills: 3 | Status: SHIPPED | OUTPATIENT
Start: 2025-08-26

## 2025-08-26 ASSESSMENT — PAIN SCALES - GENERAL: PAINLEVEL_OUTOF10: 2

## 2025-09-25 ENCOUNTER — APPOINTMENT (OUTPATIENT)
Dept: PRIMARY CARE | Facility: CLINIC | Age: 60
End: 2025-09-25
Payer: COMMERCIAL

## 2026-01-20 ENCOUNTER — APPOINTMENT (OUTPATIENT)
Dept: RHEUMATOLOGY | Facility: CLINIC | Age: 61
End: 2026-01-20
Payer: COMMERCIAL